# Patient Record
Sex: FEMALE | Race: WHITE
[De-identification: names, ages, dates, MRNs, and addresses within clinical notes are randomized per-mention and may not be internally consistent; named-entity substitution may affect disease eponyms.]

---

## 2016-11-25 VITALS
SYSTOLIC BLOOD PRESSURE: 180 MMHG | DIASTOLIC BLOOD PRESSURE: 94 MMHG | SYSTOLIC BLOOD PRESSURE: 180 MMHG | SYSTOLIC BLOOD PRESSURE: 180 MMHG | DIASTOLIC BLOOD PRESSURE: 94 MMHG | SYSTOLIC BLOOD PRESSURE: 180 MMHG | DIASTOLIC BLOOD PRESSURE: 94 MMHG | SYSTOLIC BLOOD PRESSURE: 180 MMHG | SYSTOLIC BLOOD PRESSURE: 180 MMHG | DIASTOLIC BLOOD PRESSURE: 94 MMHG | SYSTOLIC BLOOD PRESSURE: 180 MMHG | SYSTOLIC BLOOD PRESSURE: 180 MMHG | SYSTOLIC BLOOD PRESSURE: 180 MMHG | DIASTOLIC BLOOD PRESSURE: 94 MMHG | DIASTOLIC BLOOD PRESSURE: 94 MMHG | SYSTOLIC BLOOD PRESSURE: 180 MMHG | DIASTOLIC BLOOD PRESSURE: 94 MMHG | DIASTOLIC BLOOD PRESSURE: 94 MMHG | DIASTOLIC BLOOD PRESSURE: 94 MMHG | SYSTOLIC BLOOD PRESSURE: 180 MMHG | DIASTOLIC BLOOD PRESSURE: 94 MMHG | SYSTOLIC BLOOD PRESSURE: 180 MMHG | SYSTOLIC BLOOD PRESSURE: 180 MMHG | DIASTOLIC BLOOD PRESSURE: 94 MMHG | DIASTOLIC BLOOD PRESSURE: 94 MMHG | DIASTOLIC BLOOD PRESSURE: 94 MMHG | SYSTOLIC BLOOD PRESSURE: 180 MMHG | SYSTOLIC BLOOD PRESSURE: 180 MMHG | SYSTOLIC BLOOD PRESSURE: 180 MMHG | DIASTOLIC BLOOD PRESSURE: 94 MMHG | DIASTOLIC BLOOD PRESSURE: 94 MMHG | DIASTOLIC BLOOD PRESSURE: 94 MMHG

## 2017-06-19 ENCOUNTER — HOSPITAL ENCOUNTER (OUTPATIENT)
Dept: HOSPITAL 63 - LAB | Age: 77
Discharge: HOME | End: 2017-06-19
Attending: INTERNAL MEDICINE
Payer: MEDICARE

## 2017-06-19 DIAGNOSIS — E03.9: Primary | ICD-10-CM

## 2017-06-19 PROCEDURE — 84443 ASSAY THYROID STIM HORMONE: CPT

## 2017-10-19 ENCOUNTER — HOSPITAL ENCOUNTER (OUTPATIENT)
Dept: HOSPITAL 63 - LAB | Age: 77
Discharge: HOME | End: 2017-10-19
Attending: INTERNAL MEDICINE
Payer: MEDICARE

## 2017-10-19 DIAGNOSIS — E03.9: Primary | ICD-10-CM

## 2017-10-19 PROCEDURE — 84443 ASSAY THYROID STIM HORMONE: CPT

## 2017-11-09 ENCOUNTER — HOSPITAL ENCOUNTER (OUTPATIENT)
Dept: HOSPITAL 63 - LAB | Age: 77
Discharge: HOME | End: 2017-11-09
Attending: NURSE PRACTITIONER
Payer: MEDICARE

## 2017-11-09 DIAGNOSIS — Z51.81: Primary | ICD-10-CM

## 2017-11-09 DIAGNOSIS — Z79.899: ICD-10-CM

## 2017-11-09 DIAGNOSIS — Z94.4: ICD-10-CM

## 2017-11-09 LAB
ALBUMIN SERPL-MCNC: 3.5 G/DL (ref 3.4–5)
ALBUMIN/GLOB SERPL: 1.1 {RATIO} (ref 1–1.7)
ALP SERPL-CCNC: 39 U/L (ref 46–116)
ALT SERPL-CCNC: 29 U/L (ref 14–59)
ANION GAP SERPL CALC-SCNC: 9 MMOL/L (ref 6–14)
APTT PPP: YELLOW S
AST SERPL-CCNC: 36 U/L (ref 15–37)
BACTERIA #/AREA URNS HPF: 0 /HPF
BASOPHILS # BLD AUTO: 0 X10^3/UL (ref 0–0.2)
BASOPHILS NFR BLD: 1 % (ref 0–3)
BILIRUB SERPL-MCNC: 0.8 MG/DL (ref 0.2–1)
BILIRUB UR QL STRIP: (no result)
BUN/CREAT SERPL: 20 (ref 6–20)
CA-I SERPL ISE-MCNC: 28 MG/DL (ref 7–20)
CALCIUM SERPL-MCNC: 9.5 MG/DL (ref 8.5–10.1)
CHLORIDE SERPL-SCNC: 105 MMOL/L (ref 98–107)
CK SERPL-CCNC: 143 U/L (ref 26–192)
CO2 SERPL-SCNC: 29 MMOL/L (ref 21–32)
CREAT SERPL-MCNC: 1.4 MG/DL (ref 0.6–1)
EOSINOPHIL NFR BLD: 0.2 X10^3/UL (ref 0–0.7)
EOSINOPHIL NFR BLD: 7 % (ref 0–3)
ERYTHROCYTE [DISTWIDTH] IN BLOOD BY AUTOMATED COUNT: 14.8 % (ref 11.5–14.5)
FIBRINOGEN PPP-MCNC: CLEAR MG/DL
GFR SERPLBLD BASED ON 1.73 SQ M-ARVRAT: 36.7 ML/MIN
GLOBULIN SER-MCNC: 3.1 G/DL (ref 2.2–3.8)
GLUCOSE SERPL-MCNC: 115 MG/DL (ref 70–99)
GLUCOSE UR STRIP-MCNC: (no result) MG/DL
HCT VFR BLD CALC: 34.2 % (ref 36–47)
HGB BLD-MCNC: 11.2 G/DL (ref 12–15.5)
LYMPHOCYTES # BLD: 0.7 X10^3/UL (ref 1–4.8)
LYMPHOCYTES NFR BLD AUTO: 26 % (ref 24–48)
MCH RBC QN AUTO: 28 PG (ref 25–35)
MCHC RBC AUTO-ENTMCNC: 33 G/DL (ref 31–37)
MCV RBC AUTO: 86 FL (ref 79–100)
MONO #: 0.3 X10^3/UL (ref 0–1.1)
MONOCYTES NFR BLD: 12 % (ref 0–9)
NEUT #: 1.5 X10^3UL (ref 1.8–7.7)
NEUTROPHILS NFR BLD AUTO: 55 % (ref 31–73)
NITRITE UR QL STRIP: (no result)
PLATELET # BLD AUTO: 191 X10^3/UL (ref 140–400)
POTASSIUM SERPL-SCNC: 3.9 MMOL/L (ref 3.5–5.1)
PROT SERPL-MCNC: 6.6 G/DL (ref 6.4–8.2)
RBC # BLD AUTO: 3.97 X10^6/UL (ref 3.5–5.4)
RBC #/AREA URNS HPF: (no result) /HPF (ref 0–2)
SODIUM SERPL-SCNC: 143 MMOL/L (ref 136–145)
SP GR UR STRIP: 1.01
SQUAMOUS #/AREA URNS LPF: (no result) /LPF
UROBILINOGEN UR-MCNC: 0.2 MG/DL
WBC # BLD AUTO: 2.7 X10^3/UL (ref 4–11)
WBC #/AREA URNS HPF: (no result) /HPF (ref 0–4)

## 2017-11-09 PROCEDURE — 80053 COMPREHEN METABOLIC PANEL: CPT

## 2017-11-09 PROCEDURE — 87086 URINE CULTURE/COLONY COUNT: CPT

## 2017-11-09 PROCEDURE — 81001 URINALYSIS AUTO W/SCOPE: CPT

## 2017-11-09 PROCEDURE — 82550 ASSAY OF CK (CPK): CPT

## 2017-11-09 PROCEDURE — 36415 COLL VENOUS BLD VENIPUNCTURE: CPT

## 2017-11-09 PROCEDURE — 80158 DRUG ASSAY CYCLOSPORINE: CPT

## 2017-11-09 PROCEDURE — 85025 COMPLETE CBC W/AUTO DIFF WBC: CPT

## 2018-02-08 ENCOUNTER — HOSPITAL ENCOUNTER (OUTPATIENT)
Dept: HOSPITAL 63 - LAB | Age: 78
Discharge: HOME | End: 2018-02-08
Attending: INTERNAL MEDICINE
Payer: MEDICARE

## 2018-02-08 DIAGNOSIS — I10: ICD-10-CM

## 2018-02-08 DIAGNOSIS — E03.9: Primary | ICD-10-CM

## 2018-02-08 DIAGNOSIS — E11.9: ICD-10-CM

## 2018-02-08 PROCEDURE — 84443 ASSAY THYROID STIM HORMONE: CPT

## 2018-11-02 ENCOUNTER — HOSPITAL ENCOUNTER (OUTPATIENT)
Dept: HOSPITAL 63 - LAB | Age: 78
Discharge: HOME | End: 2018-11-02
Attending: NURSE PRACTITIONER
Payer: MEDICARE

## 2018-11-02 DIAGNOSIS — Z94.0: ICD-10-CM

## 2018-11-02 DIAGNOSIS — I10: ICD-10-CM

## 2018-11-02 DIAGNOSIS — Z79.899: ICD-10-CM

## 2018-11-02 DIAGNOSIS — Z48.22: Primary | ICD-10-CM

## 2018-11-02 PROCEDURE — 87799 DETECT AGENT NOS DNA QUANT: CPT

## 2018-11-02 PROCEDURE — 36415 COLL VENOUS BLD VENIPUNCTURE: CPT

## 2018-12-07 ENCOUNTER — HOSPITAL ENCOUNTER (OUTPATIENT)
Dept: HOSPITAL 63 - LAB | Age: 78
Discharge: HOME | End: 2018-12-07
Payer: MEDICARE

## 2018-12-07 DIAGNOSIS — Z79.899: ICD-10-CM

## 2018-12-07 DIAGNOSIS — Z48.22: Primary | ICD-10-CM

## 2018-12-07 DIAGNOSIS — Z94.0: ICD-10-CM

## 2018-12-07 LAB
ALBUMIN SERPL-MCNC: 3.3 G/DL (ref 3.4–5)
ALBUMIN/GLOB SERPL: 1.1 {RATIO} (ref 1–1.7)
ALP SERPL-CCNC: 56 U/L (ref 46–116)
ALT SERPL-CCNC: 32 U/L (ref 14–59)
ANION GAP SERPL CALC-SCNC: 9 MMOL/L (ref 6–14)
APTT PPP: YELLOW S
AST SERPL-CCNC: 39 U/L (ref 15–37)
BACTERIA #/AREA URNS HPF: 0 /HPF
BASOPHILS # BLD AUTO: 0 X10^3/UL (ref 0–0.2)
BASOPHILS NFR BLD: 1 % (ref 0–3)
BILIRUB SERPL-MCNC: 0.8 MG/DL (ref 0.2–1)
BILIRUB UR QL STRIP: (no result)
BUN/CREAT SERPL: 21 (ref 6–20)
CA-I SERPL ISE-MCNC: 30 MG/DL (ref 7–20)
CALCIUM SERPL-MCNC: 10.2 MG/DL (ref 8.5–10.1)
CHLORIDE SERPL-SCNC: 106 MMOL/L (ref 98–107)
CO2 SERPL-SCNC: 27 MMOL/L (ref 21–32)
CREAT SERPL-MCNC: 1.4 MG/DL (ref 0.6–1)
EOSINOPHIL NFR BLD: 0.1 X10^3/UL (ref 0–0.7)
EOSINOPHIL NFR BLD: 3 % (ref 0–3)
ERYTHROCYTE [DISTWIDTH] IN BLOOD BY AUTOMATED COUNT: 14.1 % (ref 11.5–14.5)
FIBRINOGEN PPP-MCNC: (no result) MG/DL
GFR SERPLBLD BASED ON 1.73 SQ M-ARVRAT: 36.6 ML/MIN
GLOBULIN SER-MCNC: 3.1 G/DL (ref 2.2–3.8)
GLUCOSE SERPL-MCNC: 102 MG/DL (ref 70–99)
GLUCOSE UR STRIP-MCNC: (no result) MG/DL
HCT VFR BLD CALC: 35.7 % (ref 36–47)
HGB BLD-MCNC: 11.7 G/DL (ref 12–15.5)
LYMPHOCYTES # BLD: 0.8 X10^3/UL (ref 1–4.8)
LYMPHOCYTES NFR BLD AUTO: 28 % (ref 24–48)
MCH RBC QN AUTO: 28 PG (ref 25–35)
MCHC RBC AUTO-ENTMCNC: 33 G/DL (ref 31–37)
MCV RBC AUTO: 85 FL (ref 79–100)
MONO #: 0.3 X10^3/UL (ref 0–1.1)
MONOCYTES NFR BLD: 11 % (ref 0–9)
NEUT #: 1.6 X10^3UL (ref 1.8–7.7)
NEUTROPHILS NFR BLD AUTO: 57 % (ref 31–73)
NITRITE UR QL STRIP: (no result)
PLATELET # BLD AUTO: 210 X10^3/UL (ref 140–400)
POTASSIUM SERPL-SCNC: 4.3 MMOL/L (ref 3.5–5.1)
PROT SERPL-MCNC: 6.4 G/DL (ref 6.4–8.2)
RBC # BLD AUTO: 4.21 X10^6/UL (ref 3.5–5.4)
RBC #/AREA URNS HPF: 0 /HPF (ref 0–2)
SODIUM SERPL-SCNC: 142 MMOL/L (ref 136–145)
SP GR UR STRIP: 1.02
SQUAMOUS #/AREA URNS LPF: (no result) /LPF
UROBILINOGEN UR-MCNC: 0.2 MG/DL
WBC # BLD AUTO: 2.9 X10^3/UL (ref 4–11)

## 2018-12-07 PROCEDURE — 80053 COMPREHEN METABOLIC PANEL: CPT

## 2018-12-07 PROCEDURE — 81001 URINALYSIS AUTO W/SCOPE: CPT

## 2018-12-07 PROCEDURE — 85025 COMPLETE CBC W/AUTO DIFF WBC: CPT

## 2018-12-07 PROCEDURE — 87086 URINE CULTURE/COLONY COUNT: CPT

## 2018-12-07 PROCEDURE — 36415 COLL VENOUS BLD VENIPUNCTURE: CPT

## 2018-12-07 PROCEDURE — 80158 DRUG ASSAY CYCLOSPORINE: CPT

## 2019-06-14 ENCOUNTER — HOSPITAL ENCOUNTER (OUTPATIENT)
Dept: HOSPITAL 63 - LAB | Age: 79
Discharge: HOME | End: 2019-06-14
Attending: INTERNAL MEDICINE
Payer: MEDICARE

## 2019-06-14 DIAGNOSIS — E11.9: Primary | ICD-10-CM

## 2019-06-14 DIAGNOSIS — Z79.4: ICD-10-CM

## 2019-06-14 DIAGNOSIS — Z79.899: ICD-10-CM

## 2019-06-14 DIAGNOSIS — I10: ICD-10-CM

## 2019-06-14 DIAGNOSIS — E03.9: ICD-10-CM

## 2019-06-14 DIAGNOSIS — E78.5: ICD-10-CM

## 2019-06-14 LAB
ALBUMIN SERPL-MCNC: 3.5 G/DL (ref 3.4–5)
ALBUMIN/GLOB SERPL: 1.2 {RATIO} (ref 1–1.7)
ALP SERPL-CCNC: 41 U/L (ref 46–116)
ALT SERPL-CCNC: 28 U/L (ref 14–59)
ANION GAP SERPL CALC-SCNC: 8 MMOL/L (ref 6–14)
AST SERPL-CCNC: 35 U/L (ref 15–37)
BILIRUB SERPL-MCNC: 0.8 MG/DL (ref 0.2–1)
BUN/CREAT SERPL: 18 (ref 6–20)
CA-I SERPL ISE-MCNC: 23 MG/DL (ref 7–20)
CALCIUM PTH: 9.9 MG/DL (ref 8.7–10.3)
CALCIUM SERPL-MCNC: 9.9 MG/DL (ref 8.5–10.1)
CHLORIDE SERPL-SCNC: 108 MMOL/L (ref 98–107)
CHOLEST/HDLC SERPL: 3 {RATIO}
CO2 SERPL-SCNC: 27 MMOL/L (ref 21–32)
CREAT SERPL-MCNC: 1.3 MG/DL (ref 0.6–1)
CREATININE PTH: 1.17 MG/DL (ref 0.57–1)
GFR SERPLBLD BASED ON 1.73 SQ M-ARVRAT: 39.8 ML/MIN
GLOBULIN SER-MCNC: 2.9 G/DL (ref 2.2–3.8)
GLUCOSE SERPL-MCNC: 99 MG/DL (ref 70–99)
HDLC SERPL-MCNC: 41 MG/DL (ref 40–60)
LDLC: 69 MG/DL (ref 0–100)
POTASSIUM SERPL-SCNC: 4.1 MMOL/L (ref 3.5–5.1)
PROT SERPL-MCNC: 6.4 G/DL (ref 6.4–8.2)
PTH-INTACT SERPL-MCNC: 95 PG/ML (ref 15–65)
SODIUM SERPL-SCNC: 143 MMOL/L (ref 136–145)
T4 FREE SERPL-MCNC: 1.73 NG/DL (ref 0.76–1.46)
THYROID STIM HORMONE (TSH): 0.07 UIU/ML (ref 0.36–3.74)
TRIGL SERPL-MCNC: 108 MG/DL (ref 0–150)
VLDLC: 21 MG/DL (ref 0–40)

## 2019-06-14 PROCEDURE — 84166 PROTEIN E-PHORESIS/URINE/CSF: CPT

## 2019-06-14 PROCEDURE — 83970 ASSAY OF PARATHORMONE: CPT

## 2019-06-14 PROCEDURE — 36415 COLL VENOUS BLD VENIPUNCTURE: CPT

## 2019-06-14 PROCEDURE — 82306 VITAMIN D 25 HYDROXY: CPT

## 2019-06-14 PROCEDURE — 80061 LIPID PANEL: CPT

## 2019-06-14 PROCEDURE — 84439 ASSAY OF FREE THYROXINE: CPT

## 2019-06-14 PROCEDURE — 80053 COMPREHEN METABOLIC PANEL: CPT

## 2019-06-14 PROCEDURE — 84165 PROTEIN E-PHORESIS SERUM: CPT

## 2019-06-14 PROCEDURE — 82533 TOTAL CORTISOL: CPT

## 2019-06-14 PROCEDURE — 84443 ASSAY THYROID STIM HORMONE: CPT

## 2019-06-14 PROCEDURE — 82043 UR ALBUMIN QUANTITATIVE: CPT

## 2019-06-17 LAB
ALBUM: 3.4 G/DL (ref 2.9–4.4)
ALBUMIN MFR UR ELPH: 50.2 %
ALPHA1 GLOB SERPL ELPH-MCNC: 0.2 G/DL (ref 0–0.4)
ALPHA2 GLOB SERPL ELPH-MCNC: 0.5 G/DL (ref 0.4–1)
B-GLOBULIN MFR UR ELPH: 25.8 %
B-GLOBULIN SERPL ELPH-MCNC: 1.1 G/DL (ref 0.7–1.3)
GAMMA GLOB MFR UR ELPH: 11.8 %
GAMMA GLOB SERPL ELPH-MCNC: 0.6 G/DL (ref 0.4–1.8)
Lab: 3.5 %
Lab: 8.7 %
PROT SERPL-MCNC: 5.7 G/DL (ref 6–8.5)
PROT UR-MCNC: 12.7 MG/DL
SPEP AG RATIO: 1.5 (ref 0.7–1.7)

## 2019-07-03 ENCOUNTER — HOSPITAL ENCOUNTER (OUTPATIENT)
Dept: HOSPITAL 63 - LAB | Age: 79
Discharge: HOME | End: 2019-07-03
Attending: NURSE PRACTITIONER
Payer: MEDICARE

## 2019-07-03 DIAGNOSIS — D89.9: ICD-10-CM

## 2019-07-03 DIAGNOSIS — Z94.4: ICD-10-CM

## 2019-07-03 DIAGNOSIS — Z48.23: Primary | ICD-10-CM

## 2019-07-03 LAB
ALBUMIN SERPL-MCNC: 3.4 G/DL (ref 3.4–5)
ALBUMIN/GLOB SERPL: 1.2 {RATIO} (ref 1–1.7)
ALP SERPL-CCNC: 41 U/L (ref 46–116)
ALT SERPL-CCNC: 26 U/L (ref 14–59)
ANION GAP SERPL CALC-SCNC: 9 MMOL/L (ref 6–14)
AST SERPL-CCNC: 33 U/L (ref 15–37)
BASOPHILS # BLD AUTO: 0 X10^3/UL (ref 0–0.2)
BASOPHILS NFR BLD: 0 % (ref 0–3)
BILIRUB SERPL-MCNC: 1.1 MG/DL (ref 0.2–1)
BUN/CREAT SERPL: 28 (ref 6–20)
CA-I SERPL ISE-MCNC: 36 MG/DL (ref 7–20)
CALCIUM SERPL-MCNC: 10.4 MG/DL (ref 8.5–10.1)
CHLORIDE SERPL-SCNC: 107 MMOL/L (ref 98–107)
CO2 SERPL-SCNC: 27 MMOL/L (ref 21–32)
CREAT SERPL-MCNC: 1.3 MG/DL (ref 0.6–1)
EOSINOPHIL NFR BLD: 0.1 X10^3/UL (ref 0–0.7)
EOSINOPHIL NFR BLD: 4 % (ref 0–3)
ERYTHROCYTE [DISTWIDTH] IN BLOOD BY AUTOMATED COUNT: 14.4 % (ref 11.5–14.5)
GFR SERPLBLD BASED ON 1.73 SQ M-ARVRAT: 39.8 ML/MIN
GLOBULIN SER-MCNC: 2.8 G/DL (ref 2.2–3.8)
GLUCOSE SERPL-MCNC: 103 MG/DL (ref 70–99)
HCT VFR BLD CALC: 35.4 % (ref 36–47)
HGB BLD-MCNC: 11.5 G/DL (ref 12–15.5)
LYMPHOCYTES # BLD: 0.8 X10^3/UL (ref 1–4.8)
LYMPHOCYTES NFR BLD AUTO: 26 % (ref 24–48)
MCH RBC QN AUTO: 28 PG (ref 25–35)
MCHC RBC AUTO-ENTMCNC: 33 G/DL (ref 31–37)
MCV RBC AUTO: 87 FL (ref 79–100)
MONO #: 0.3 X10^3/UL (ref 0–1.1)
MONOCYTES NFR BLD: 10 % (ref 0–9)
NEUT #: 1.8 X10^3UL (ref 1.8–7.7)
NEUTROPHILS NFR BLD AUTO: 61 % (ref 31–73)
PLATELET # BLD AUTO: 178 X10^3/UL (ref 140–400)
POTASSIUM SERPL-SCNC: 4.1 MMOL/L (ref 3.5–5.1)
PROT SERPL-MCNC: 6.2 G/DL (ref 6.4–8.2)
RBC # BLD AUTO: 4.06 X10^6/UL (ref 3.5–5.4)
SODIUM SERPL-SCNC: 143 MMOL/L (ref 136–145)
WBC # BLD AUTO: 2.9 X10^3/UL (ref 4–11)

## 2019-07-03 PROCEDURE — 36415 COLL VENOUS BLD VENIPUNCTURE: CPT

## 2019-07-03 PROCEDURE — 85025 COMPLETE CBC W/AUTO DIFF WBC: CPT

## 2019-07-03 PROCEDURE — 80053 COMPREHEN METABOLIC PANEL: CPT

## 2019-07-03 PROCEDURE — 80158 DRUG ASSAY CYCLOSPORINE: CPT

## 2020-02-18 ENCOUNTER — HOSPITAL ENCOUNTER (OUTPATIENT)
Dept: HOSPITAL 63 - LAB | Age: 80
Discharge: HOME | End: 2020-02-18
Attending: NURSE PRACTITIONER
Payer: MEDICARE

## 2020-02-18 DIAGNOSIS — M85.9: Primary | ICD-10-CM

## 2020-02-18 DIAGNOSIS — Z94.4: ICD-10-CM

## 2020-02-18 DIAGNOSIS — Z79.899: ICD-10-CM

## 2020-02-18 LAB
CHOLEST/HDLC SERPL: 3 {RATIO}
CREATININE,RANDOM URINE: 36 MG/DL
HDLC SERPL-MCNC: 31 MG/DL (ref 40–60)
LDLC: 47 MG/DL (ref 0–100)
THYROID STIM HORMONE (TSH): 0.11 UIU/ML (ref 0.36–3.74)
TRIGL SERPL-MCNC: 95 MG/DL (ref 0–150)
VLDLC: 19 MG/DL (ref 0–40)

## 2020-02-18 PROCEDURE — 82570 ASSAY OF URINE CREATININE: CPT

## 2020-02-18 PROCEDURE — 36415 COLL VENOUS BLD VENIPUNCTURE: CPT

## 2020-02-18 PROCEDURE — 84436 ASSAY OF TOTAL THYROXINE: CPT

## 2020-02-18 PROCEDURE — 84156 ASSAY OF PROTEIN URINE: CPT

## 2020-02-18 PROCEDURE — 80061 LIPID PANEL: CPT

## 2020-02-18 PROCEDURE — 82306 VITAMIN D 25 HYDROXY: CPT

## 2020-02-18 PROCEDURE — 84443 ASSAY THYROID STIM HORMONE: CPT

## 2020-02-18 PROCEDURE — 83036 HEMOGLOBIN GLYCOSYLATED A1C: CPT

## 2020-02-19 LAB — HBA1C MFR BLD: 5.1 % (ref 4.8–5.6)

## 2020-05-26 ENCOUNTER — HOSPITAL ENCOUNTER (OUTPATIENT)
Dept: HOSPITAL 63 - LAB | Age: 80
End: 2020-05-26
Attending: ORTHOPAEDIC SURGERY
Payer: MEDICARE

## 2020-05-26 ENCOUNTER — HOSPITAL ENCOUNTER (OUTPATIENT)
Dept: HOSPITAL 63 - LAB | Age: 80
End: 2020-05-26
Attending: NURSE PRACTITIONER
Payer: MEDICARE

## 2020-05-26 ENCOUNTER — HOSPITAL ENCOUNTER (OUTPATIENT)
Dept: HOSPITAL 63 - LAB | Age: 80
End: 2020-05-26
Attending: INTERNAL MEDICINE
Payer: MEDICARE
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
## 2020-05-26 DIAGNOSIS — Z79.4: ICD-10-CM

## 2020-05-26 DIAGNOSIS — Z94.4: Primary | ICD-10-CM

## 2020-05-26 DIAGNOSIS — E78.5: ICD-10-CM

## 2020-05-26 DIAGNOSIS — T14.8XXA: Primary | ICD-10-CM

## 2020-05-26 DIAGNOSIS — Z79.899: ICD-10-CM
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
## 2020-05-26 DIAGNOSIS — R52: ICD-10-CM

## 2020-05-26 DIAGNOSIS — E03.9: ICD-10-CM
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
## 2020-05-26 DIAGNOSIS — M81.0: ICD-10-CM

## 2020-05-26 DIAGNOSIS — I10: ICD-10-CM

## 2020-05-26 DIAGNOSIS — Z94.0: ICD-10-CM

## 2020-05-26 DIAGNOSIS — E11.9: Primary | ICD-10-CM

## 2020-05-26 LAB
ALBUMIN SERPL-MCNC: 3.5 G/DL (ref 3.4–5)
ALBUMIN SERPL-MCNC: 3.5 G/DL (ref 3.4–5)
ALBUMIN/GLOB SERPL: 1.1 {RATIO} (ref 1–1.7)
ALBUMIN/GLOB SERPL: 1.1 {RATIO} (ref 1–1.7)
ALP SERPL-CCNC: 47 U/L (ref 46–116)
ALP SERPL-CCNC: 48 U/L (ref 46–116)
ALT SERPL-CCNC: 30 U/L (ref 14–59)
ALT SERPL-CCNC: 30 U/L (ref 14–59)
ANION GAP SERPL CALC-SCNC: 10 MMOL/L (ref 6–14)
ANION GAP SERPL CALC-SCNC: 8 MMOL/L (ref 6–14)
AST SERPL-CCNC: 41 U/L (ref 15–37)
AST SERPL-CCNC: 42 U/L (ref 15–37)
BASOPHILS # BLD AUTO: 0 X10^3/UL (ref 0–0.2)
BASOPHILS NFR BLD: 0 % (ref 0–3)
BILIRUB SERPL-MCNC: 1 MG/DL (ref 0.2–1)
BILIRUB SERPL-MCNC: 1 MG/DL (ref 0.2–1)
BUN/CREAT SERPL: 24 (ref 6–20)
BUN/CREAT SERPL: 25 (ref 6–20)
CA-I SERPL ISE-MCNC: 29 MG/DL (ref 7–20)
CA-I SERPL ISE-MCNC: 30 MG/DL (ref 7–20)
CALCIUM SERPL-MCNC: 10.1 MG/DL (ref 8.5–10.1)
CALCIUM SERPL-MCNC: 10.3 MG/DL (ref 8.5–10.1)
CHLORIDE SERPL-SCNC: 104 MMOL/L (ref 98–107)
CHLORIDE SERPL-SCNC: 104 MMOL/L (ref 98–107)
CHOLEST/HDLC SERPL: 3 {RATIO}
CO2 SERPL-SCNC: 26 MMOL/L (ref 21–32)
CO2 SERPL-SCNC: 29 MMOL/L (ref 21–32)
CREAT SERPL-MCNC: 1.2 MG/DL (ref 0.6–1)
CREAT SERPL-MCNC: 1.2 MG/DL (ref 0.6–1)
CREATININE,RANDOM URINE: 149.2 MG/DL
EOSINOPHIL NFR BLD: 0.1 X10^3/UL (ref 0–0.7)
EOSINOPHIL NFR BLD: 4 % (ref 0–3)
ERYTHROCYTE [DISTWIDTH] IN BLOOD BY AUTOMATED COUNT: 15 % (ref 11.5–14.5)
GFR SERPLBLD BASED ON 1.73 SQ M-ARVRAT: 43.6 ML/MIN
GFR SERPLBLD BASED ON 1.73 SQ M-ARVRAT: 43.6 ML/MIN
GLOBULIN SER-MCNC: 3.2 G/DL (ref 2.2–3.8)
GLOBULIN SER-MCNC: 3.3 G/DL (ref 2.2–3.8)
GLUCOSE SERPL-MCNC: 87 MG/DL (ref 70–99)
GLUCOSE SERPL-MCNC: 88 MG/DL (ref 70–99)
HCT VFR BLD CALC: 38.5 % (ref 36–47)
HDLC SERPL-MCNC: 38 MG/DL (ref 40–60)
HGB BLD-MCNC: 12.6 G/DL (ref 12–15.5)
LDLC: 67 MG/DL (ref 0–100)
LYMPHOCYTES # BLD: 0.9 X10^3/UL (ref 1–4.8)
LYMPHOCYTES NFR BLD AUTO: 25 % (ref 24–48)
MCH RBC QN AUTO: 29 PG (ref 25–35)
MCHC RBC AUTO-ENTMCNC: 33 G/DL (ref 31–37)
MCV RBC AUTO: 88 FL (ref 79–100)
MONO #: 0.2 X10^3/UL (ref 0–1.1)
MONOCYTES NFR BLD: 7 % (ref 0–9)
NEUT #: 2.2 X10^3UL (ref 1.8–7.7)
NEUTROPHILS NFR BLD AUTO: 63 % (ref 31–73)
PLATELET # BLD AUTO: 200 X10^3/UL (ref 140–400)
POTASSIUM SERPL-SCNC: 3.9 MMOL/L (ref 3.5–5.1)
POTASSIUM SERPL-SCNC: 4 MMOL/L (ref 3.5–5.1)
PROT SERPL-MCNC: 6.7 G/DL (ref 6.4–8.2)
PROT SERPL-MCNC: 6.8 G/DL (ref 6.4–8.2)
RBC # BLD AUTO: 4.36 X10^6/UL (ref 3.5–5.4)
SODIUM SERPL-SCNC: 140 MMOL/L (ref 136–145)
SODIUM SERPL-SCNC: 141 MMOL/L (ref 136–145)
T4 FREE SERPL-MCNC: 1.55 NG/DL (ref 0.76–1.46)
THYROID STIM HORMONE (TSH): 0.44 UIU/ML (ref 0.36–3.74)
TRIGL SERPL-MCNC: 115 MG/DL (ref 0–150)
VLDLC: 23 MG/DL (ref 0–40)
WBC # BLD AUTO: 3.4 X10^3/UL (ref 4–11)

## 2020-05-26 PROCEDURE — 82043 UR ALBUMIN QUANTITATIVE: CPT

## 2020-05-26 PROCEDURE — 36415 COLL VENOUS BLD VENIPUNCTURE: CPT

## 2020-05-26 PROCEDURE — 85025 COMPLETE CBC W/AUTO DIFF WBC: CPT

## 2020-05-26 PROCEDURE — 84443 ASSAY THYROID STIM HORMONE: CPT

## 2020-05-26 PROCEDURE — 80158 DRUG ASSAY CYCLOSPORINE: CPT

## 2020-05-26 PROCEDURE — 84166 PROTEIN E-PHORESIS/URINE/CSF: CPT

## 2020-05-26 PROCEDURE — 84165 PROTEIN E-PHORESIS SERUM: CPT

## 2020-05-26 PROCEDURE — 82306 VITAMIN D 25 HYDROXY: CPT

## 2020-05-26 PROCEDURE — 86140 C-REACTIVE PROTEIN: CPT

## 2020-05-26 PROCEDURE — 80053 COMPREHEN METABOLIC PANEL: CPT

## 2020-05-26 PROCEDURE — 84156 ASSAY OF PROTEIN URINE: CPT

## 2020-05-26 PROCEDURE — 80061 LIPID PANEL: CPT

## 2020-05-26 PROCEDURE — 82533 TOTAL CORTISOL: CPT

## 2020-05-26 PROCEDURE — 82570 ASSAY OF URINE CREATININE: CPT

## 2020-05-26 PROCEDURE — 83970 ASSAY OF PARATHORMONE: CPT

## 2020-05-26 PROCEDURE — 84439 ASSAY OF FREE THYROXINE: CPT

## 2020-05-27 LAB
CALCIUM PTH: 10.3 MG/DL (ref 8.7–10.3)
CREATININE PTH: 1.18 MG/DL (ref 0.57–1)
PTH-INTACT SERPL-MCNC: 80 PG/ML (ref 15–65)

## 2020-05-28 LAB
ALBUM: 3.5 G/DL (ref 2.9–4.4)
ALPHA1 GLOB SERPL ELPH-MCNC: 0.3 G/DL (ref 0–0.4)
ALPHA2 GLOB SERPL ELPH-MCNC: 0.5 G/DL (ref 0.4–1)
B-GLOBULIN SERPL ELPH-MCNC: 1.1 G/DL (ref 0.7–1.3)
GAMMA GLOB SERPL ELPH-MCNC: 0.9 G/DL (ref 0.4–1.8)
PROT SERPL-MCNC: 6.3 G/DL (ref 6–8.5)
SPEP AG RATIO: 1.3 (ref 0.7–1.7)

## 2020-06-30 ENCOUNTER — HOSPITAL ENCOUNTER (OUTPATIENT)
Dept: HOSPITAL 63 - DXRAD | Age: 80
Discharge: HOME | End: 2020-06-30
Attending: INTERNAL MEDICINE
Payer: MEDICARE

## 2020-06-30 DIAGNOSIS — E03.9: ICD-10-CM

## 2020-06-30 DIAGNOSIS — M81.0: Primary | ICD-10-CM

## 2020-06-30 LAB
ALBUMIN SERPL-MCNC: 3.2 G/DL (ref 3.4–5)
ALBUMIN/GLOB SERPL: 0.9 {RATIO} (ref 1–1.7)
ALP SERPL-CCNC: 52 U/L (ref 46–116)
ALT SERPL-CCNC: 24 U/L (ref 14–59)
ANION GAP SERPL CALC-SCNC: 10 MMOL/L (ref 6–14)
AST SERPL-CCNC: 35 U/L (ref 15–37)
BILIRUB SERPL-MCNC: 0.9 MG/DL (ref 0.2–1)
BUN/CREAT SERPL: 16 (ref 6–20)
CA-I SERPL ISE-MCNC: 21 MG/DL (ref 7–20)
CALCIUM SERPL-MCNC: 9.9 MG/DL (ref 8.5–10.1)
CHLORIDE SERPL-SCNC: 105 MMOL/L (ref 98–107)
CO2 SERPL-SCNC: 26 MMOL/L (ref 21–32)
CREAT SERPL-MCNC: 1.3 MG/DL (ref 0.6–1)
GFR SERPLBLD BASED ON 1.73 SQ M-ARVRAT: 39.7 ML/MIN
GLOBULIN SER-MCNC: 3.5 G/DL (ref 2.2–3.8)
GLUCOSE SERPL-MCNC: 105 MG/DL (ref 70–99)
POTASSIUM SERPL-SCNC: 3.7 MMOL/L (ref 3.5–5.1)
PROT SERPL-MCNC: 6.7 G/DL (ref 6.4–8.2)
SODIUM SERPL-SCNC: 141 MMOL/L (ref 136–145)
T4 FREE SERPL-MCNC: 1.56 NG/DL (ref 0.76–1.46)
THYROID STIM HORMONE (TSH): 0.96 UIU/ML (ref 0.36–3.74)

## 2020-06-30 PROCEDURE — 82306 VITAMIN D 25 HYDROXY: CPT

## 2020-06-30 PROCEDURE — 84439 ASSAY OF FREE THYROXINE: CPT

## 2020-06-30 PROCEDURE — 84443 ASSAY THYROID STIM HORMONE: CPT

## 2020-06-30 PROCEDURE — 77080 DXA BONE DENSITY AXIAL: CPT

## 2020-06-30 PROCEDURE — 36415 COLL VENOUS BLD VENIPUNCTURE: CPT

## 2020-06-30 PROCEDURE — 80053 COMPREHEN METABOLIC PANEL: CPT

## 2020-06-30 NOTE — RAD
EXAM: DUAL ENERGY X-RAY ABSORPTIOMETRY (DEXA).

 

HISTORY: Postmenopausal screening.

 

FINDINGS: The lowest measured T-score is -2.3 in the right femoral neck, 

based on a bone mineral density of 0.722 g/cm^2. Refer to the worksheets

for full detail.

 

No comparison examinations are available. 

 

IMPRESSION:

Low bone mass. Bone mineral density yields a T-score between -1.0 and 

-2.5. Fracture risk is increased.

 

FRAX was not calculated.

 

METHODOLOGY: Dual energy x-ray absorptiometry was performed to measure 

bone mineral density. The following analysis is based on the 2019 Official

Positions of the International Society for Clinical Densitometry: 

 

Measurements of the hips and the average of L1-L4 are preferred. When the 

spine and/or hip cannot be feasibly measured or interpreted, or in the 

setting of hyperparathyroidism, distal radial bone mineral density may be 

measured. 

 

The lumbar spine T-score is based on the average bone mineral density of 

L1-L4. In the setting of artifact or anatomic abnormality, some lumbar 

levels may be excluded, and the remaining levels used for calculation. A 

single lumbar level is not used for diagnosis, and if only a single level 

is available for assessment, another anatomic site will be used to assign 

a diagnosis.

 

The hip T-score is based on the bone mineral density measurement of the 

femoral neck or total proximal femur of either side, whichever is lowest. 

Bilateral mean values are not used for diagnosis.

 

The forearm T-score is derived from 33% of the distal radius of the 

nondominant forearm.

 

For postmenopausal and perimenopausal women, and men age 50 or older, of 

all ethnic groups, T-scores are calculated through comparison of the 

current measurement with the NHANES III database standard for  

females aged 20-29 years. The lowest T-score of the evaluated anatomic 

sites is used to assign a diagnosis based on the World Health Organization

densitometric classification. 

 

In premenopausal females and males younger than age 50, a Z-score is 

calculated based on population specific reference data for patient sex and

self-reported ethnicity.

 

Electronically signed by: CHEKO Singh MD (6/30/2020 2:11 PM) 

[facility]

## 2020-08-12 ENCOUNTER — HOSPITAL ENCOUNTER (OUTPATIENT)
Dept: HOSPITAL 63 - LAB | Age: 80
Discharge: HOME | End: 2020-08-12
Attending: ORTHOPAEDIC SURGERY
Payer: MEDICARE

## 2020-08-12 ENCOUNTER — HOSPITAL ENCOUNTER (OUTPATIENT)
Dept: HOSPITAL 63 - LAB | Age: 80
Discharge: HOME | End: 2020-08-12
Attending: NURSE PRACTITIONER
Payer: MEDICARE

## 2020-08-12 DIAGNOSIS — R52: Primary | ICD-10-CM

## 2020-08-12 DIAGNOSIS — Z94.4: ICD-10-CM

## 2020-08-12 DIAGNOSIS — Z79.899: Primary | ICD-10-CM

## 2020-08-12 LAB
ALBUMIN SERPL-MCNC: 3 G/DL (ref 3.4–5)
ALBUMIN/GLOB SERPL: 0.9 {RATIO} (ref 1–1.7)
ALP SERPL-CCNC: 50 U/L (ref 46–116)
ALT SERPL-CCNC: 41 U/L (ref 14–59)
ANION GAP SERPL CALC-SCNC: 7 MMOL/L (ref 6–14)
AST SERPL-CCNC: 52 U/L (ref 15–37)
BASOPHILS # BLD AUTO: 0 X10^3/UL (ref 0–0.2)
BASOPHILS NFR BLD: 0 % (ref 0–3)
BILIRUB SERPL-MCNC: 0.8 MG/DL (ref 0.2–1)
BUN/CREAT SERPL: 22 (ref 6–20)
CA-I SERPL ISE-MCNC: 29 MG/DL (ref 7–20)
CALCIUM SERPL-MCNC: 9.8 MG/DL (ref 8.5–10.1)
CHLORIDE SERPL-SCNC: 105 MMOL/L (ref 98–107)
CO2 SERPL-SCNC: 29 MMOL/L (ref 21–32)
CREAT SERPL-MCNC: 1.3 MG/DL (ref 0.6–1)
EOSINOPHIL NFR BLD: 0.1 X10^3/UL (ref 0–0.7)
EOSINOPHIL NFR BLD: 4 % (ref 0–3)
ERYTHROCYTE [DISTWIDTH] IN BLOOD BY AUTOMATED COUNT: 15.2 % (ref 11.5–14.5)
GFR SERPLBLD BASED ON 1.73 SQ M-ARVRAT: 39.7 ML/MIN
GLOBULIN SER-MCNC: 3.5 G/DL (ref 2.2–3.8)
GLUCOSE SERPL-MCNC: 102 MG/DL (ref 70–99)
HCT VFR BLD CALC: 36.6 % (ref 36–47)
HGB BLD-MCNC: 11.9 G/DL (ref 12–15.5)
LYMPHOCYTES # BLD: 0.8 X10^3/UL (ref 1–4.8)
LYMPHOCYTES NFR BLD AUTO: 22 % (ref 24–48)
MCH RBC QN AUTO: 28 PG (ref 25–35)
MCHC RBC AUTO-ENTMCNC: 33 G/DL (ref 31–37)
MCV RBC AUTO: 87 FL (ref 79–100)
MONO #: 0.3 X10^3/UL (ref 0–1.1)
MONOCYTES NFR BLD: 8 % (ref 0–9)
NEUT #: 2.4 X10^3UL (ref 1.8–7.7)
NEUTROPHILS NFR BLD AUTO: 66 % (ref 31–73)
PLATELET # BLD AUTO: 259 X10^3/UL (ref 140–400)
POTASSIUM SERPL-SCNC: 3.9 MMOL/L (ref 3.5–5.1)
PROT SERPL-MCNC: 6.5 G/DL (ref 6.4–8.2)
RBC # BLD AUTO: 4.2 X10^6/UL (ref 3.5–5.4)
SODIUM SERPL-SCNC: 141 MMOL/L (ref 136–145)
WBC # BLD AUTO: 3.6 X10^3/UL (ref 4–11)

## 2020-08-12 PROCEDURE — 80158 DRUG ASSAY CYCLOSPORINE: CPT

## 2020-08-12 PROCEDURE — 86140 C-REACTIVE PROTEIN: CPT

## 2020-08-12 PROCEDURE — 80053 COMPREHEN METABOLIC PANEL: CPT

## 2020-08-12 PROCEDURE — 85025 COMPLETE CBC W/AUTO DIFF WBC: CPT

## 2020-08-12 PROCEDURE — 36415 COLL VENOUS BLD VENIPUNCTURE: CPT

## 2020-10-26 ENCOUNTER — HOSPITAL ENCOUNTER (OUTPATIENT)
Dept: HOSPITAL 63 - LAB | Age: 80
End: 2020-10-26
Attending: NURSE PRACTITIONER
Payer: MEDICARE

## 2020-10-26 DIAGNOSIS — Z79.899: Primary | ICD-10-CM

## 2020-10-26 DIAGNOSIS — Z94.4: ICD-10-CM

## 2020-10-26 LAB
ALBUMIN SERPL-MCNC: 3.3 G/DL (ref 3.4–5)
ALBUMIN/GLOB SERPL: 1.1 {RATIO} (ref 1–1.7)
ALP SERPL-CCNC: 36 U/L (ref 46–116)
ALT SERPL-CCNC: 25 U/L (ref 14–59)
ANION GAP SERPL CALC-SCNC: 8 MMOL/L (ref 6–14)
AST SERPL-CCNC: 38 U/L (ref 15–37)
BASOPHILS # BLD AUTO: 0 X10^3/UL (ref 0–0.2)
BASOPHILS NFR BLD: 0 % (ref 0–3)
BILIRUB SERPL-MCNC: 0.9 MG/DL (ref 0.2–1)
BUN/CREAT SERPL: 24 (ref 6–20)
CA-I SERPL ISE-MCNC: 29 MG/DL (ref 7–20)
CALCIUM SERPL-MCNC: 10.1 MG/DL (ref 8.5–10.1)
CHLORIDE SERPL-SCNC: 106 MMOL/L (ref 98–107)
CO2 SERPL-SCNC: 28 MMOL/L (ref 21–32)
CREAT SERPL-MCNC: 1.2 MG/DL (ref 0.6–1)
EOSINOPHIL NFR BLD: 0.1 X10^3/UL (ref 0–0.7)
EOSINOPHIL NFR BLD: 3 % (ref 0–3)
ERYTHROCYTE [DISTWIDTH] IN BLOOD BY AUTOMATED COUNT: 15.9 % (ref 11.5–14.5)
GFR SERPLBLD BASED ON 1.73 SQ M-ARVRAT: 43.4 ML/MIN
GLOBULIN SER-MCNC: 3 G/DL (ref 2.2–3.8)
GLUCOSE SERPL-MCNC: 101 MG/DL (ref 70–99)
HCT VFR BLD CALC: 38.7 % (ref 36–47)
HGB BLD-MCNC: 12.5 G/DL (ref 12–15.5)
LYMPHOCYTES # BLD: 1 X10^3/UL (ref 1–4.8)
LYMPHOCYTES NFR BLD AUTO: 25 % (ref 24–48)
MCH RBC QN AUTO: 28 PG (ref 25–35)
MCHC RBC AUTO-ENTMCNC: 32 G/DL (ref 31–37)
MCV RBC AUTO: 88 FL (ref 79–100)
MONO #: 0.3 X10^3/UL (ref 0–1.1)
MONOCYTES NFR BLD: 7 % (ref 0–9)
NEUT #: 2.4 X10^3UL (ref 1.8–7.7)
NEUTROPHILS NFR BLD AUTO: 64 % (ref 31–73)
PLATELET # BLD AUTO: 208 X10^3/UL (ref 140–400)
POTASSIUM SERPL-SCNC: 3.9 MMOL/L (ref 3.5–5.1)
PROT SERPL-MCNC: 6.3 G/DL (ref 6.4–8.2)
RBC # BLD AUTO: 4.42 X10^6/UL (ref 3.5–5.4)
SODIUM SERPL-SCNC: 142 MMOL/L (ref 136–145)
WBC # BLD AUTO: 3.8 X10^3/UL (ref 4–11)

## 2020-10-26 PROCEDURE — 80053 COMPREHEN METABOLIC PANEL: CPT

## 2020-10-26 PROCEDURE — 85025 COMPLETE CBC W/AUTO DIFF WBC: CPT

## 2020-10-26 PROCEDURE — 80158 DRUG ASSAY CYCLOSPORINE: CPT

## 2021-04-20 ENCOUNTER — HOSPITAL ENCOUNTER (INPATIENT)
Dept: HOSPITAL 63 - ER | Age: 81
LOS: 2 days | Discharge: ANOTHER HOSPITAL | DRG: 486 | End: 2021-04-22
Attending: FAMILY MEDICINE | Admitting: FAMILY MEDICINE
Payer: MEDICARE

## 2021-04-20 VITALS — SYSTOLIC BLOOD PRESSURE: 136 MMHG | DIASTOLIC BLOOD PRESSURE: 62 MMHG

## 2021-04-20 VITALS — HEIGHT: 64 IN | WEIGHT: 163.58 LBS | BODY MASS INDEX: 27.93 KG/M2

## 2021-04-20 DIAGNOSIS — D63.8: ICD-10-CM

## 2021-04-20 DIAGNOSIS — E11.65: ICD-10-CM

## 2021-04-20 DIAGNOSIS — I10: ICD-10-CM

## 2021-04-20 DIAGNOSIS — Z99.2: ICD-10-CM

## 2021-04-20 DIAGNOSIS — E86.0: ICD-10-CM

## 2021-04-20 DIAGNOSIS — M00.9: Primary | ICD-10-CM

## 2021-04-20 DIAGNOSIS — Z94.4: ICD-10-CM

## 2021-04-20 DIAGNOSIS — L02.416: ICD-10-CM

## 2021-04-20 DIAGNOSIS — Z85.828: ICD-10-CM

## 2021-04-20 DIAGNOSIS — E03.9: ICD-10-CM

## 2021-04-20 DIAGNOSIS — E44.0: ICD-10-CM

## 2021-04-20 DIAGNOSIS — Z94.0: ICD-10-CM

## 2021-04-20 DIAGNOSIS — Z90.710: ICD-10-CM

## 2021-04-20 DIAGNOSIS — M86.9: ICD-10-CM

## 2021-04-20 DIAGNOSIS — L03.116: ICD-10-CM

## 2021-04-20 DIAGNOSIS — E83.52: ICD-10-CM

## 2021-04-20 LAB
ALBUMIN SERPL-MCNC: 3 G/DL (ref 3.4–5)
ALBUMIN/GLOB SERPL: 0.9 {RATIO} (ref 1–1.7)
ALP SERPL-CCNC: 46 U/L (ref 46–116)
ALT SERPL-CCNC: 27 U/L (ref 14–59)
ANION GAP SERPL CALC-SCNC: 10 MMOL/L (ref 6–14)
AST SERPL-CCNC: 36 U/L (ref 15–37)
BASOPHILS # BLD AUTO: 0 X10^3/UL (ref 0–0.2)
BASOPHILS NFR BLD: 0 % (ref 0–3)
BILIRUB SERPL-MCNC: 1.1 MG/DL (ref 0.2–1)
BUN/CREAT SERPL: 23 (ref 6–20)
CA-I SERPL ISE-MCNC: 30 MG/DL (ref 7–20)
CALCIUM SERPL-MCNC: 10.5 MG/DL (ref 8.5–10.1)
CHLORIDE SERPL-SCNC: 108 MMOL/L (ref 98–107)
CO2 SERPL-SCNC: 26 MMOL/L (ref 21–32)
CREAT SERPL-MCNC: 1.3 MG/DL (ref 0.6–1)
EOSINOPHIL NFR BLD: 0 % (ref 0–3)
EOSINOPHIL NFR BLD: 0 X10^3/UL (ref 0–0.7)
ERYTHROCYTE [DISTWIDTH] IN BLOOD BY AUTOMATED COUNT: 14.3 % (ref 11.5–14.5)
GFR SERPLBLD BASED ON 1.73 SQ M-ARVRAT: 39.6 ML/MIN
GLOBULIN SER-MCNC: 3.2 G/DL (ref 2.2–3.8)
GLUCOSE SERPL-MCNC: 122 MG/DL (ref 70–99)
HCT VFR BLD CALC: 36.8 % (ref 36–47)
HGB BLD-MCNC: 12.1 G/DL (ref 12–15.5)
LYMPHOCYTES # BLD: 0.5 X10^3/UL (ref 1–4.8)
LYMPHOCYTES NFR BLD AUTO: 9 % (ref 24–48)
MCH RBC QN AUTO: 29 PG (ref 25–35)
MCHC RBC AUTO-ENTMCNC: 33 G/DL (ref 31–37)
MCV RBC AUTO: 88 FL (ref 79–100)
MONO #: 0.6 X10^3/UL (ref 0–1.1)
MONOCYTES NFR BLD: 10 % (ref 0–9)
NEUT #: 4.6 X10^3UL (ref 1.8–7.7)
NEUTROPHILS NFR BLD AUTO: 80 % (ref 31–73)
PLATELET # BLD AUTO: 188 X10^3/UL (ref 140–400)
POTASSIUM SERPL-SCNC: 3.7 MMOL/L (ref 3.5–5.1)
PROT SERPL-MCNC: 6.2 G/DL (ref 6.4–8.2)
RBC # BLD AUTO: 4.17 X10^6/UL (ref 3.5–5.4)
SODIUM SERPL-SCNC: 144 MMOL/L (ref 136–145)
WBC # BLD AUTO: 5.8 X10^3/UL (ref 4–11)

## 2021-04-20 PROCEDURE — 87077 CULTURE AEROBIC IDENTIFY: CPT

## 2021-04-20 PROCEDURE — 93005 ELECTROCARDIOGRAM TRACING: CPT

## 2021-04-20 PROCEDURE — 80053 COMPREHEN METABOLIC PANEL: CPT

## 2021-04-20 PROCEDURE — 73564 X-RAY EXAM KNEE 4 OR MORE: CPT

## 2021-04-20 PROCEDURE — 87086 URINE CULTURE/COLONY COUNT: CPT

## 2021-04-20 PROCEDURE — 81001 URINALYSIS AUTO W/SCOPE: CPT

## 2021-04-20 PROCEDURE — 83605 ASSAY OF LACTIC ACID: CPT

## 2021-04-20 PROCEDURE — 87040 BLOOD CULTURE FOR BACTERIA: CPT

## 2021-04-20 PROCEDURE — 87186 SC STD MICRODIL/AGAR DIL: CPT

## 2021-04-20 PROCEDURE — 36415 COLL VENOUS BLD VENIPUNCTURE: CPT

## 2021-04-20 PROCEDURE — 83036 HEMOGLOBIN GLYCOSYLATED A1C: CPT

## 2021-04-20 PROCEDURE — 87205 SMEAR GRAM STAIN: CPT

## 2021-04-20 PROCEDURE — 80061 LIPID PANEL: CPT

## 2021-04-20 PROCEDURE — 82947 ASSAY GLUCOSE BLOOD QUANT: CPT

## 2021-04-20 PROCEDURE — 80048 BASIC METABOLIC PNL TOTAL CA: CPT

## 2021-04-20 PROCEDURE — 85025 COMPLETE CBC W/AUTO DIFF WBC: CPT

## 2021-04-20 NOTE — PHYS DOC
Past History


Past Medical History:  Diabetes, Hypertension, Hypothyroid, Other


 (MANDI RM)


Past Surgical History:  Hysterectomy, Other


Additional Past Surgical Histo:  left kidney transplant, liver transplant


 (MANDI RM)


Smoking:  Non-smoker


Alcohol Use:  None


Drug Use:  None


 (MANDI RM)





General Adult


EDM:


Chief Complaint:  LOWER EXT PAIN





HPI:


HPI:





Patient is a 80-year-old female presents with left knee swelling, redness, pain.

 Patient states 3 days ago she started having drainage from her knee, and unable

to transfer herself from her wheelchair.  Patient reports tenderness to fever at

home.  Denies taking any Tylenol or ibuprofen.  Patient states "I broke my leg 3

years ago and had to have screws placed".  Patient lives at home with her h

band, who brought her to the emergency room due to an increase in pain.  

Denies injury.


 (MANDI RM)





Review of Systems:


Review of Systems:


Constitutional: Reports fever, chills 


Eyes:  Denies change in visual acuity 


HENT:  Denies nasal congestion or sore throat 


Respiratory:  Denies cough or shortness of breath 


Cardiovascular:  Denies chest pain or edema 


GI:  Denies abdominal pain, nausea, vomiting, bloody stools or diarrhea 


: Denies dysuria 


Musculoskeletal:  Denies back pain or joint pain 


Integument: Left leg is red, swollen, warm to the touch, purulent discharge


Neurologic:  Denies headache, focal weakness or sensory changes 


Endocrine:  Denies polyuria or polydipsia 


Lymphatic:  Denies swollen glands 


Psychiatric:  Denies depression or anxiety


 (MANDI RM)





Allergies:


Allergies:





Allergies








Coded Allergies Type Severity Reaction Last Updated Verified


 


  No Known Drug Allergies    12/2/13 No








 (MANDI RM)





Physical Exam:


PE:





Constitutional: Well developed, well nourished, no acute distress, non-toxic 

appearance. []


HENT: Normocephalic, atraumatic, bilateral external ears normal, oropharynx 

moist, no oral exudates, nose normal. []


Eyes: PERRLA, EOMI, conjunctiva normal, no discharge. [] 


Neck: Normal range of motion, no tenderness, supple, no stridor. [] 


Cardiovascular:Heart rate regular rhythm, no murmur []


Lungs & Thorax:  Bilateral breath sounds clear to auscultation []


Abdomen: Bowel sounds normal, soft, no tenderness, no masses, no pulsatile 

masses. [] 


Skin: Warm, purulent discharge, swollen, left knee


Back: No tenderness, no CVA tenderness. [] 


Extremities: Left knee tenderness, ROM intact, swelling


Neurologic: Alert and oriented X 3, normal motor function, normal sensory 

function, no focal deficits noted. []


Psychologic: Affect normal, judgement normal, mood normal. []


 (MANDI RM)





EKG:


EKG:


[] Sinus rhythm.  Heart rate 77 bpm.


 (MANDI RM)





Radiology/Procedures:


Radiology/Procedures:


[]


 (MANDI RM)





Heart Score:


C/O Chest Pain:  No


Risk Factors:


Risk Factors:  DM, Current or recent (<one month) smoker, HTN, HLP, family 

history of CAD, obesity.


Risk Scores:


Score 0 - 3:  2.5% MACE over next 6 weeks - Discharge Home


Score 4 - 6:  20.3% MACE over next 6 weeks - Admit for Clinical Observation


Score 7 - 10:  72.7% MACE over next 6 weeks - Early Invasive Strategies


 (MANDI RM)





Course & Med Decision Making:


Course & Med Decision Making


Pertinent Labs and Imaging studies reviewed. (See chart for details)





[] 80-year-old female presents with left knee swelling, redness, purulent 

discharge and pain.  Patient was brought in by her  after not being able 

to transfer herself today from her wheelchair.  She has been running 102 fever 

at home.  Patient broke her left leg 3 years ago and had to have surgery on the 

left knee.  CBC, CMP, blood cultures, lactic ordered.  Vancomycin and cefepime 

started for infection.  X-ray of left knee.  Contacted Dr. Gary to admit 

patient.  Dr. Gary will accept patient.  Patient is going to be admitted 

for a septic joint.  Patient agrees with this plan and is okay with admit.


 (MANDI RM)


Course & Med Decision Making


agree with NP's workup and disposition per note.


 (MARIANA PARRISH MD)


S[place] Disclaimer:


Dragon Disclaimer:


This electronic medical record was generated, in whole or in part, using a voice

 recognition dictation system.


 (MANDI RM)





Departure


Departure:


Impression:  


   Primary Impression:  


   Septic joint


   Qualified Codes:  M00.9 - Pyogenic arthritis, unspecified


Disposition:  09 ADMITTED AS INPATIENT


Admitting Physician:  Gianni Lombardi


 (MANDI RM)


Condition:  STABLE


Referrals:  


REZA WOODRUFF MD (PCP)











MANDI RM               Apr 20, 2021 20:33


MARIANA PARRISH MD               Apr 23, 2021 18:14

## 2021-04-20 NOTE — RAD
Exam: Left knee 3 views



INDICATION: Left knee swelling



TECHNIQUE: Frontal, lateral and oblique views left knee



Comparisons: None



FINDINGS:

Fixation at the distal left femur. There is nonunion fracture at the distal left femoral metaphysis w
hich appears mildly angulated. Diffuse surrounding soft tissue swelling. Diffuse osteopenia. No acute
 fractures identified. Joint spaces are otherwise well-maintained.



IMPRESSION:

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 Correla
te for infection.



Electronically signed by: Tita Oliveros MD (4/20/2021 10:01 PM) OLY

## 2021-04-20 NOTE — EKG
[facility] [address], [place] [address]

Test Date:    2021               Test Time:    20:35:01

Pat Name:     SARAH ADAIR              Department:   

Patient ID:   [id number]           Room:         [facility]

Gender:       F                        Technician:   MARCIE

:          1940               Requested By: MANDI RM

Order Number: [id number]            Reading MD:     

                                 Measurements

Intervals                              Axis          

Rate:         72                       P:            42

RI:           160                      QRS:          27

QRSD:         88                       T:            18

QT:           388                                    

QTc:          426                                    

                           Interpretive Statements

SINUS RHYTHM

NORMAL ECG

RI6.02

No previous ECG available for comparison

## 2021-04-20 NOTE — NUR
Pharmacy Vancomycin Dosing Note



S:Consulted to monitor and dose vancomycin started 04/20/21.



O:SARAH ADAIR is a 80 year old F with , SEPTIC JOINT .



Height: 5 feet, 4 inches

Weight: 75.9 kg

Ideal Body Weight: 54.70 

Adjusted Body Weight: 63.18 

Dosing Weight: Actual



Other Antibiotics: 

CEFEPIME 2GM IV Q8HRS



LABS:

Last BUN: 30 

Last Creatinine: 1.3 

Creatinine Clearance: 35.57 

Last WBC: 5.8 

 



Vancomycin Dosing:

Loading Dose: 2000 mg x1

Dosing Weight: Actual

Target Trough: 10-20





A: Based on: Actula weight, renal function, and indication





P: 1. Begin Vancomycin 1250 mg IV q24h

   2. Follow up Trough level on 04/22/21 at 2230 

   3. Pharmacy will continue to monitor, follow and adjust therapy as needed.

 

 MARIANN KEN,  04/20/21 1529

## 2021-04-20 NOTE — NUR
ADMISSION:



The patient, SARAH AADIR, 79 y/o, F admitted by CHRIS HAM MD, was given written 
information regarding hospital policies, unit procedures and contact persons.  Pt arrived to 
ICU bed 4 via gurney, accompanied by [facility] and ED staff. Pt here for septic joint. Pt 
reports increased pain and swelling to left lateral knee over the past 3 days. Pt notes a 
large amount of purulent drainage since yesterday. Pt has an appt with an ortho surgeon at 
[facility] tomorrow after having to wait over a year for follow-up due to COVID-19 restrictions, 
but pt reports pain became too intense to wait for office visit and presented to ED for 
treatment. Pt febrile in ED, given PO tylenol. Orders for vanco and cefepime to cover 
infection. Pic taken of left knee, wound care consult placed. Discussed POC with pt, V/U. 
Call light in reach. 



Valuables were checked and logged. Pt brought some home meds in 2 separate pill sorter boxes 
labeled Wed and Thurs, as well as 10 individually packaged Gengraf capsules, 25mg each. Meds 
were bagged and sent to pharmacy for safekeeping during stay.

## 2021-04-21 VITALS — SYSTOLIC BLOOD PRESSURE: 157 MMHG | DIASTOLIC BLOOD PRESSURE: 58 MMHG

## 2021-04-21 VITALS — SYSTOLIC BLOOD PRESSURE: 110 MMHG | DIASTOLIC BLOOD PRESSURE: 45 MMHG

## 2021-04-21 VITALS — DIASTOLIC BLOOD PRESSURE: 52 MMHG | SYSTOLIC BLOOD PRESSURE: 130 MMHG

## 2021-04-21 VITALS — DIASTOLIC BLOOD PRESSURE: 52 MMHG | SYSTOLIC BLOOD PRESSURE: 121 MMHG

## 2021-04-21 LAB
APTT PPP: (no result) S
BACTERIA #/AREA URNS HPF: (no result) /HPF
BILIRUB UR QL STRIP: (no result)
FIBRINOGEN PPP-MCNC: (no result) MG/DL
GLUCOSE UR STRIP-MCNC: (no result) MG/DL
NITRITE UR QL STRIP: (no result)
RBC #/AREA URNS HPF: 0 /HPF (ref 0–2)
SP GR UR STRIP: >=1.03
SQUAMOUS #/AREA URNS LPF: (no result) /LPF
UROBILINOGEN UR-MCNC: 0.2 MG/DL
WBC #/AREA URNS HPF: (no result) /HPF (ref 0–4)

## 2021-04-21 PROCEDURE — 0S9D0ZZ DRAINAGE OF LEFT KNEE JOINT, OPEN APPROACH: ICD-10-PCS | Performed by: FAMILY MEDICINE

## 2021-04-21 RX ADMIN — MYCOPHENOLATE MOFETIL SCH MG: 250 CAPSULE ORAL at 18:06

## 2021-04-21 RX ADMIN — Medication SCH EA: at 10:46

## 2021-04-21 RX ADMIN — Medication SCH CAP: at 09:52

## 2021-04-21 RX ADMIN — MYCOPHENOLATE MOFETIL SCH MG: 250 CAPSULE ORAL at 10:46

## 2021-04-21 RX ADMIN — ENOXAPARIN SODIUM SCH MG: 100 INJECTION SUBCUTANEOUS at 12:52

## 2021-04-21 RX ADMIN — Medication SCH CAP: at 20:36

## 2021-04-21 RX ADMIN — VITAMIN D, TAB 1000IU (100/BT) SCH UNIT: 25 TAB at 12:52

## 2021-04-21 RX ADMIN — CARVEDILOL SCH MG: 12.5 TABLET, FILM COATED ORAL at 18:02

## 2021-04-21 RX ADMIN — ACETAMINOPHEN PRN MG: 500 TABLET ORAL at 12:52

## 2021-04-21 RX ADMIN — Medication SCH EA: at 18:06

## 2021-04-21 RX ADMIN — CEFEPIME SCH MLS/HR: 2 INJECTION, POWDER, FOR SOLUTION INTRAVENOUS at 20:36

## 2021-04-21 RX ADMIN — FOLIC ACID SCH MG: 1 TABLET ORAL at 12:52

## 2021-04-21 RX ADMIN — CEFEPIME SCH MLS/HR: 2 INJECTION, POWDER, FOR SOLUTION INTRAVENOUS at 09:52

## 2021-04-21 NOTE — EKG
[facility] ED

                    [facility] [address] [place] [id number]

Test Date:    2021               Test Time:    20:43:55

Pat Name:     SARAH ADAIR              Department:   

Patient ID:   [id number]           Room:         [facility] 1

Gender:       F                        Technician:   MARCIE

:          1940               Requested By: CHRIS HAM

Order Number: [id number]            Reading MD:     

                                 Measurements

Intervals                              Axis          

Rate:         77                       P:            90

RI:           190                      QRS:          -54

QRSD:         122                      T:            133

QT:           370                                    

QTc:          420                                    

                           Interpretive Statements

SINUS RHYTHM

ABNORMAL LEFT AXIS DEVIATION

[facility] WITH REPOLARIZATION ABNORMALITY

QRS(T) CONTOUR ABNORMALITY

CONSIDER ANTEROSEPTAL MYOCARDIAL DAMAGE

ABNORMAL ECG

RI6.02

No previous ECG available for comparison

## 2021-04-22 VITALS — SYSTOLIC BLOOD PRESSURE: 160 MMHG | DIASTOLIC BLOOD PRESSURE: 78 MMHG

## 2021-04-22 VITALS — SYSTOLIC BLOOD PRESSURE: 121 MMHG | DIASTOLIC BLOOD PRESSURE: 53 MMHG

## 2021-04-22 LAB
ANION GAP SERPL CALC-SCNC: 8 MMOL/L (ref 6–14)
BASOPHILS # BLD AUTO: 0 X10^3/UL (ref 0–0.2)
BASOPHILS NFR BLD: 0 % (ref 0–3)
CA-I SERPL ISE-MCNC: 35 MG/DL (ref 7–20)
CALCIUM SERPL-MCNC: 9.4 MG/DL (ref 8.5–10.1)
CHLORIDE SERPL-SCNC: 111 MMOL/L (ref 98–107)
CO2 SERPL-SCNC: 23 MMOL/L (ref 21–32)
CREAT SERPL-MCNC: 1.4 MG/DL (ref 0.6–1)
EOSINOPHIL NFR BLD: 0 X10^3/UL (ref 0–0.7)
EOSINOPHIL NFR BLD: 1 % (ref 0–3)
ERYTHROCYTE [DISTWIDTH] IN BLOOD BY AUTOMATED COUNT: 14.4 % (ref 11.5–14.5)
GFR SERPLBLD BASED ON 1.73 SQ M-ARVRAT: 36.4 ML/MIN
GLUCOSE SERPL-MCNC: 101 MG/DL (ref 70–99)
HCT VFR BLD CALC: 29.4 % (ref 36–47)
HGB BLD-MCNC: 9.7 G/DL (ref 12–15.5)
LYMPHOCYTES # BLD: 0.6 X10^3/UL (ref 1–4.8)
LYMPHOCYTES NFR BLD AUTO: 12 % (ref 24–48)
MCH RBC QN AUTO: 29 PG (ref 25–35)
MCHC RBC AUTO-ENTMCNC: 33 G/DL (ref 31–37)
MCV RBC AUTO: 89 FL (ref 79–100)
MONO #: 0.5 X10^3/UL (ref 0–1.1)
MONOCYTES NFR BLD: 11 % (ref 0–9)
NEUT #: 3.6 X10^3UL (ref 1.8–7.7)
NEUTROPHILS NFR BLD AUTO: 76 % (ref 31–73)
PLATELET # BLD AUTO: 145 X10^3/UL (ref 140–400)
POTASSIUM SERPL-SCNC: 3.9 MMOL/L (ref 3.5–5.1)
RBC # BLD AUTO: 3.32 X10^6/UL (ref 3.5–5.4)
SODIUM SERPL-SCNC: 142 MMOL/L (ref 136–145)
WBC # BLD AUTO: 4.7 X10^3/UL (ref 4–11)

## 2021-04-22 RX ADMIN — Medication SCH CAP: at 09:09

## 2021-04-22 RX ADMIN — CARVEDILOL SCH MG: 12.5 TABLET, FILM COATED ORAL at 09:10

## 2021-04-22 RX ADMIN — ACETAMINOPHEN PRN MG: 500 TABLET ORAL at 06:25

## 2021-04-22 RX ADMIN — FOLIC ACID SCH MG: 1 TABLET ORAL at 09:09

## 2021-04-22 RX ADMIN — MYCOPHENOLATE MOFETIL SCH MG: 250 CAPSULE ORAL at 06:26

## 2021-04-22 RX ADMIN — CEFEPIME SCH MLS/HR: 2 INJECTION, POWDER, FOR SOLUTION INTRAVENOUS at 09:08

## 2021-04-22 RX ADMIN — Medication SCH EA: at 06:26

## 2021-04-22 RX ADMIN — ENOXAPARIN SODIUM SCH MG: 100 INJECTION SUBCUTANEOUS at 12:30

## 2021-04-22 RX ADMIN — VITAMIN D, TAB 1000IU (100/BT) SCH UNIT: 25 TAB at 09:09

## 2021-04-22 NOTE — OP
DATE OF SURGERY: 04/21/2021

INDICATION:  This is a 80-year-old female who came in with cellulitis to her 

left knee area specifically with an abscess noted.



DESCRIPTION OF PROCEDURE:  The patient in turn had purulent drainage coming 

forth from the abscess.  The patient after she gave her written consent, told 

the possible complications and signed the proper papers.  The patient in turn 

had the area Betadined and then infiltrated with lidocaine 2% with epinephrine 

to sufficiently numb the area.  The patient then an elliptical incision made 

around the opening of the abscess that was draining and purulent matter was 

removed from the wound area itself.  It was then flushed thoroughly with normal 

saline and then iodoform gauze was placed in the wound itself and then packed 

with sterile dressings, raised elevation of the leg.  Continue on present 

therapy of vancomycin and cefepime.  The patient tolerated the procedure well.  

There were no complications and questions were made and answered appropriately. 

Assistance was given by Vinnie, the ICU nurse here.



IMPRESSION:  Abscess to the left knee.



PROCEDURE:  I and D of the left abscess located on the lateral side of the left 

knee.







DEVONTE/SUM/PAN

DR: Familia   DD: 04/21/2021 12:17

DT: 04/21/2021 12:29   TID: [id number]

## 2021-04-22 NOTE — HP
ADMIT DATE: 04/20/2021

HISTORY OF PRESENT ILLNESS:  A 80-year-old female with a history of liver and 

kidney transplant, on immunosuppressive drugs, for the last week has noted some 

swelling to her left knee where she has had previous fractures.  Apparently, 

there are some other surgeries that were performed on that knee.  In any case, 

the patient had a huge swelling to the area and finally came in through the 

emergency room.  The patient's x-ray showed a nonunion fracture at the distal 

left femur metaphysis angulated with some tissue swelling consistent with her 

infection.  Abundant pus coming from the wound area which was raised abscess.  

Cultures and sensitivities were taken.  She is receiving IV vancomycin and 

cefepime for the infection. The vancomycin is being controlled by pharmacy 1.25 

grams of vancomycin daily IV.  Also, cefepime 2 grams IV q. 12.  The patient is 

resting comfortably and procedure later done to do an I and D on that abscess of

her left outer portion of the knee.



PAST MEDICAL HISTORY:  Significant for liver transplant, kidney transplant which

go back to 05/29/2010.  She has also been on dialysis at one time, hypertension.

 Orthopedic surgery for left leg fracture repair with hardware in 2018 by Dr. Culp at .  Fracture to the left leg, diabetes, hypothyroidism, skin cancer.

 Influenza vaccination up to date.  We are still trying to clear her for her 

COVID vaccine's adverse reaction.



ALLERGIES:  No known allergies.



MEDICATIONS:  Her medication list from home: Fenofibrate one tablet daily, 

Lipitor 30 mg, carvedilol 12.5 mg b.i.d., zinc 50 mg, insulin 2 units subQ 

p.r.n., levothyroxine sodium, folic acid 0.8 mg, vitamin B complex, vitamin D, 

CellCept 250 mg p.o. b.i.d., cyclosporine 2 capsules p.o. b.i.d., leflunomide 30

mg daily. Last three a course are for immunosuppression for her transplants.  

She sees the transplant team down at  twice yearly.



SOCIAL HISTORY:  Denies any smoking, alcohol or drug use, apparently lives at 

home.  The patient otherwise stable.  The patient is a full code.



REVIEW OF SYSTEMS:  The patient denies any headaches, visual changes, blurred 

vision, double vision.  Denies any chest pain, shortness of breath, denies any 

abdominal pain.  Primary pain is in that left knee area as noted above.  

Neurologically baseline.



PHYSICAL EXAMINATION:

GENERAL:  A pleasant white female looking stated age of 80.

VITAL SIGNS:  Blood pressure 130/52, pulse 70, respiratory rate 15.  She is 

running a temperature up to 100.2, oxygen saturation 97% on room air.

HEENT:  The patient's otherwise head was atraumatic and normocephalic.  Eyes: 

PERRLA.  Mouth and throat:  Dry mucous membranes, poor dentition.

NECK:  Supple, without JVD or carotid bruits.  No thyromegaly.

LUNGS:  Diminished without rales or rhonchi noted.

HEART:  Regular sinus rhythm.

ABDOMEN:  Soft, nontender.

EXTREMITIES:  The right leg appears to be basically normal pulses noted 

distally.  Left leg appears swollen at the left knee, especially the lateral 

side.  There is an approximately 3 x 2 cm abscess extending from the left side 

of the knee joint area.  The leg does look erythematous toward the ankle.  There

is an erythematous area approximately 5 x 6 cm at that area as well.  Pulses 

noted distally.

NEUROLOGIC:  As noted.  Speech fluent, spontaneous, appropriate and cranial 

nerves II-XII grossly intact.  The patient otherwise remains basically stable.



LABORATORY DATA:  White count 5.8, hemoglobin 12 and 36, no left shift noted.  

Chemistries: 144, 3.7, BUN and creatinine 30 and 1.3, glucose 122, calcium 

slightly high at 10.5, bilirubin 1.1.  Liver enzymes were normal.  Total albumin

3.



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

left knee, abscess to the left knee, hyperglycemia, on immunosuppressant therapy

for previous liver and kidney transplants.  The patient will be monitored 

carefully and make further evaluation on her as indicated.  Continue on the IV 

antibiotics as noted above, probably put a PICC line in her and do some other 

lab work on her.  Her blood sugars continue to monitor carefully.







DEVONTE/FRAN/NORMA

DR: DEVONTE/roxane   DD: 04/21/2021 12:15

DT: 04/21/2021 12:58   TID: [id number]

## 2021-04-22 NOTE — DS
DATE OF DISCHARGE:  04/22/2021



HOSPITAL COURSE:  A 80-year-old female noted swelling and inflammation to her

left knee with pain.  The patient had pus coming from the joint of the left

knee.  The patient has had previous surgeries in that area with Dr. Culp at

[facility].  The patient in turn had an I and D performed.  See procedure

note and at the same token, the patient was stable.  She has been placed on

vancomycin and cefepime.  The patient's culture and sensitivity reports are

still pending, although she did have a positive blood culture for positive cocci

in clusters and the patient was apparently on appropriate antibiotics.  The

patient was transferred down to  for further evaluation by orthopedic surgeon.



IMPRESSION:  Septic knee joint, left, anemia of chronic disease, dehydration,

hyperglycemia, hypercalcemia, moderate protein malnutrition.  See MRAD.



PLAN:  The patient will be transferred down to [facility] for further

evaluation as needed per orthopedic surgery.





______________________________

CHRIS HAM MD



DR:  DEVONTE/roxane  JOB#:  [id number] / [id number]

DD:  04/22/2021 18:13  DT:  04/22/2021 18:57

## 2021-04-22 NOTE — NUR
TRANSFER TO [facility]



Pt to transfer to [facility] for higher level of care for septic L knee joint. Pt transferring 
to room [id number] at [facility]. Report called to Soraya on unit 43 at .  EMS 
called to transport pt. Will continue to monitor. 



CAMMIE,RN

## 2021-04-22 NOTE — NUR
Wound/Ostomy Care



Wound Type/Assessment:  

Patient seen per wound care consult. See wound assessment. patient has abscess to left 
lateral knee. Patient stated she has had pain and swelling for a week or more, and developed 
a large abscess. Yesterday her physician Dr. Lombardi lanced this abscess per patient at 
bedside. Patient stated this instantly relieved her pain. the wound continues to drain large 
amounts of purulent creamy drainage. the knee continues to be very swollen and red. Patient 
stated she does have hardware in the left leg. Wound cleansed, assessed, and measured. 



Treatment Recommendations/Plan:  

recommendations to pack with Iodoform gauze packing, cover with ABD pad, and wrap with 
kerlix. Change daily and PRN. Dressing applied and no other wounds noted. 



Education provided: 

Patient educated on dressing changes and PU prevention. 



Offloading surface/device: 

N/A 



Recommended Referrals/Tests: 

Patient should follow up with ortho physician regarding possible infection due to hardware. 



Discharge Recommendations for dressings: 

Dressing change instructions left in room. Bed lowered and call light in reach. wound care 
will follow up on 4/27/21.

-------------------------------------------------------------------------------

Addendum: 04/22/21 at 0836 by ARNULFO LOPEZ RN

-------------------------------------------------------------------------------

This patient was seen on 4/21/21 at 1530 per wound care.

## 2021-04-23 LAB
CHOLEST/HDLC SERPL: 3 {RATIO}
HBA1C MFR BLD: 5.3 % (ref 4.8–5.6)
HDLC SERPL-MCNC: 24 MG/DL (ref 40–60)
LDLC: 33 MG/DL (ref 0–100)
TRIGL SERPL-MCNC: 104 MG/DL (ref 0–150)
VLDLC: 20 MG/DL (ref 0–40)